# Patient Record
Sex: FEMALE | Employment: UNEMPLOYED | URBAN - METROPOLITAN AREA
[De-identification: names, ages, dates, MRNs, and addresses within clinical notes are randomized per-mention and may not be internally consistent; named-entity substitution may affect disease eponyms.]

---

## 2022-06-09 ENCOUNTER — OFFICE VISIT (OUTPATIENT)
Dept: ORTHOPEDIC SURGERY | Age: 52
End: 2022-06-09
Payer: COMMERCIAL

## 2022-06-09 DIAGNOSIS — M79.672 FOOT PAIN, LEFT: Primary | ICD-10-CM

## 2022-06-09 PROCEDURE — 99213 OFFICE O/P EST LOW 20 MIN: CPT | Performed by: ORTHOPAEDIC SURGERY

## 2022-06-09 RX ORDER — L-METHYLFOLATE-ALGAE-VIT B12-B6 CAP 3-90.314-2-35 MG 3-90.314-2-35 MG
1 CAP ORAL DAILY
Qty: 20 CAPSULE | Refills: 0 | Status: SHIPPED | OUTPATIENT
Start: 2022-06-09

## 2022-06-09 NOTE — PROGRESS NOTES
Name: July Frederick   YOB: 1970   Gender: female   MRN: [id number]       Summary: left sciatica with retrocalcaneal  bursitis. Nerve conduction study shows S1 neuropathy. MEtanx started refer to [facility] for injections potential            CC: Follow-up (6  week follow up nerve conduction study L heel pain enthesopathy of left foot ankle left insertional achilles tendonitis)          HPI: July Frederick is a  46 y.o. female who presents to follow-up for the retrocalcaneal pain and leg pain   . In the past have treated her for retrocalcaneal bursitis with an injection into the retrocalcaneal bursa. This helped her pain a lot. And the pain returned. At her last visit she described numbness  burning and tingling on the back of the heel along with some various paresthesias throughout her foot. I thought this was more neurologic in nature so I started her on gabapentin, this caused a significant amount of altered neurological sensations and  vision changes so she stopped taking it. However she states the gabapentin did help her symptoms and even though she has been off it her symptoms have improved dramatically. There is signs of S1 neuropathy and neuralgia on her nerve conduction study. History was obtained by patient      ROS/Meds/PSH/PMH/FH/SH: I personally reviewed the patients standard intake form. Below are the pertinents      Tobacco:  reports that she has never smoked. She has never used smokeless tobacco.   Diabetes: None   Other: none      Physical Examination:   left lower extremity: EHL, FHL, anterior tib, gastroc, peroneals, posterior tib 5 out of 5 strength. Sensation intact to light touch to the saphenous/sural/deep peroneal/superficial peroneal/tibial  nerve. There is some generalized numbness and decreased sensation over the dorsal medial cutaneous nerve of the great toe along with the sural nerve.   No tenderness to palpation throughout the Achilles or the Achilles insertion. Nontender throughout  the ankle or the foot. Full range of motion of the ankle and foot with no pain. Imaging:    No imaging reviewed         Nerve conduction study independently reviewed by me but not performed by me Was seen today. There is signs of S1 neuropathy and neuralgia. Assessment:    Retrocalcaneal bursitis   S1 nerve impingement      Plan: This is a chronic problem with exacerbation  Patient drug management was started with SHANTAL ANDRADE.  I discussed the risk and benefits of this prescription strength medication.     I will also refer her to Dr. Tracy Lemus to be evaluated for potential injections due to her known nerve conduction study

## 2022-07-21 ENCOUNTER — OFFICE VISIT (OUTPATIENT)
Dept: ORTHOPEDIC SURGERY | Age: 52
End: 2022-07-21
Payer: COMMERCIAL

## 2022-07-21 DIAGNOSIS — M54.16 LUMBAR RADICULOPATHY: ICD-10-CM

## 2022-07-21 DIAGNOSIS — M54.17 LUMBOSACRAL RADICULOPATHY: Primary | ICD-10-CM

## 2022-07-21 DIAGNOSIS — M79.672 LEFT FOOT PAIN: ICD-10-CM

## 2022-07-21 PROCEDURE — 99204 OFFICE O/P NEW MOD 45 MIN: CPT | Performed by: PHYSICAL MEDICINE & REHABILITATION

## 2022-07-21 NOTE — PROGRESS NOTES
Date:  07/21/22     HPI:  I am seeing Summer Vizcarra in evalution/folowup. Extended new patient visit of approximately 45 minutes. She has issues with left heel pain also symptoms on the dorsum of the foot. Some paresthetic symptoms and sensitivity to touch. She has been evaluated by an orthopedic foot subspecialist is not clear that orthopedic issues referable to the left foot and ankle explains full symptomatology. I do not think she can blame this on an accident or injury. She does have some tenderness in the lumbar spine that she has had for over 10 years. We will lumbar paraspinal areas and buttocks. Some pinching pain. Worse with standing, bending, twisting. No weakness in lower extremities but does have some paresthetic symptoms in the dorsum of the left foot. She tried some gabapentin with some benefit, though his come off of it but still is feeling better. She has had no spine injections, spine surgical intervention or physical therapy on the lumbar spine. Electrophysiologic testing was performed by Dr. Onofre Aguilar and was suggestive of left S1 radiculopathy. It was felt that further spine imaging would be beneficial.    She has had no recent lumbar spine exercises physical therapy. Her pain can approach 8/10. She has some trouble bathing cleaning and dressing with some of her lumbar spine complaints. This caused some trouble at home as she works from home. This can affect her sitting abilities. She does have trouble sleeping at night. No past medical history on file. No past surgical history on file. No family history on file.      Social History     Socioeconomic History    Marital status: Unknown     Spouse name: Not on file    Number of children: Not on file    Years of education: Not on file    Highest education level: Not on file   Occupational History    Not on file   Tobacco Use    Smoking status: Never    Smokeless tobacco: Never   Substance and Sexual Activity Alcohol use: Not on file    Drug use: Not on file    Sexual activity: Not on file   Other Topics Concern    Not on file   Social History Narrative    Not on file     Social Determinants of Health     Financial Resource Strain: Not on file   Food Insecurity: Not on file   Transportation Needs: Not on file   Physical Activity: Not on file   Stress: Not on file   Social Connections: Not on file   Intimate Partner Violence: Not on file   Housing Stability: Not on file        Review of Systems       Current Outpatient Medications   Medication Sig Dispense Refill    L-methylfolate-B6-B12 (METANX) 7-16.384-5-58 MG CAPS capsule Take 1 capsule by mouth daily 20 capsule 0    esomeprazole (NEXIUM) 20 MG delayed release capsule Take 20 mg by mouth daily      estradiol (CLIMARA) 0.05 MG/24HR Place 1 patch onto the skin       No current facility-administered medications for this visit. Allergies   Allergen Reactions    Penicillins Rash    Gabapentin Rash         PHYSICAL EXAM    General: Alert and cooperative    HEENT: Clear speech    Motor Exam: Good strength    Sensory Exam: No lateralizing findings    Deep Tendon Reflexes: 1+ and symmetric. Metric ankle jerks today. No pathologically heightened reflexes. Cerebellar Exam: No ataxia in the Ue's    Extremities: No significant tenderness in the left foot to manipulation. Palpable pulse in the left foot. No significant edema    Gait / Station: Ambulates without assistance    Lumbar Spine: Has mild tenderness in lower lumbar paraspinal areas      IMPRESSION/PLAN:   Pain in limb. Has been to the appropriate orthopedic evaluation. Her symptoms could be coming from the lumbar spine, though she does not clearly have subjective symptoms gravitating in that area. We will start provider directed lumbar spine exercises. I would advocate an MRI of the lumbar spine to further evaluate. She could benefit from selective nerve root blocks at the left L5 and/or S1 levels.     L Daryl Franklin MD

## 2023-07-24 ENCOUNTER — APPOINTMENT (RX ONLY)
Dept: URBAN - METROPOLITAN AREA CLINIC 23 | Facility: CLINIC | Age: 53
Setting detail: DERMATOLOGY
End: 2023-07-24

## 2023-07-24 DIAGNOSIS — Z71.89 OTHER SPECIFIED COUNSELING: ICD-10-CM

## 2023-07-24 PROCEDURE — ? COUNSELING
